# Patient Record
Sex: MALE | Race: WHITE | Employment: STUDENT | URBAN - METROPOLITAN AREA
[De-identification: names, ages, dates, MRNs, and addresses within clinical notes are randomized per-mention and may not be internally consistent; named-entity substitution may affect disease eponyms.]

---

## 2017-10-02 ENCOUNTER — OFFICE VISIT (OUTPATIENT)
Dept: FAMILY MEDICINE CLINIC | Facility: CLINIC | Age: 22
End: 2017-10-02

## 2017-10-02 ENCOUNTER — NURSE TRIAGE (OUTPATIENT)
Dept: OTHER | Age: 22
End: 2017-10-02

## 2017-10-02 VITALS
BODY MASS INDEX: 21.33 KG/M2 | HEIGHT: 70 IN | SYSTOLIC BLOOD PRESSURE: 121 MMHG | HEART RATE: 80 BPM | WEIGHT: 149 LBS | DIASTOLIC BLOOD PRESSURE: 69 MMHG | TEMPERATURE: 98 F

## 2017-10-02 DIAGNOSIS — L23.9 ALLERGIC DERMATITIS: Primary | ICD-10-CM

## 2017-10-02 PROCEDURE — 99212 OFFICE O/P EST SF 10 MIN: CPT | Performed by: FAMILY MEDICINE

## 2017-10-02 PROCEDURE — 99213 OFFICE O/P EST LOW 20 MIN: CPT | Performed by: FAMILY MEDICINE

## 2017-10-02 RX ORDER — LAMOTRIGINE 25 MG/1
TABLET, EXTENDED RELEASE ORAL
Refills: 1 | COMMUNITY
Start: 2017-09-06

## 2017-10-02 RX ORDER — CITALOPRAM 10 MG/1
TABLET ORAL
Refills: 1 | COMMUNITY
Start: 2017-09-06

## 2017-10-02 NOTE — PROGRESS NOTES
Patient ID: Nate Draper is a 25year old male.     HPI  Patient presents with:  Rash      Action Requested: Summary for Provider     []  Critical Lab, Recommendations Needed  [] Need Additional Advice  []   FYI    []   Need Orders  [] Need Medication (55.3 kg) (14 %, Z= -1.09)*    * Growth percentiles are based on CDC 2-20 Years data. Body mass index is 21.38 kg/m².     BP Readings from Last 6 Encounters:  10/02/17 : 121/69  06/11/15 : 109/71  06/28/12 : 105/67        Review of Systems   Constitution this visit:    Allergic dermatitis  -     ALLERGY - INTERNAL  He stable. He has no complaints. I will have him see the allergist for allergy testing. Follow up if symptoms persist.  Take medicine (if given) as prescribed.   Approach to treatment disc

## 2017-10-02 NOTE — TELEPHONE ENCOUNTER
Action Requested: Summary for Provider     []  Critical Lab, Recommendations Needed  [] Need Additional Advice  []   FYI    []   Need Orders  [] Need Medications Sent to Pharmacy  []  Other     SUMMARY: Appt made today with Dr. Darrel Herron to further evalu

## 2018-12-20 ENCOUNTER — OFFICE VISIT (OUTPATIENT)
Dept: FAMILY MEDICINE CLINIC | Facility: CLINIC | Age: 23
End: 2018-12-20
Payer: COMMERCIAL

## 2018-12-20 VITALS
BODY MASS INDEX: 22.05 KG/M2 | TEMPERATURE: 98 F | SYSTOLIC BLOOD PRESSURE: 117 MMHG | DIASTOLIC BLOOD PRESSURE: 72 MMHG | WEIGHT: 154 LBS | HEART RATE: 92 BPM | HEIGHT: 70 IN

## 2018-12-20 DIAGNOSIS — Z23 NEED FOR VACCINATION: ICD-10-CM

## 2018-12-20 DIAGNOSIS — D22.9 MULTIPLE NEVI: ICD-10-CM

## 2018-12-20 DIAGNOSIS — F31.61 BIPOLAR DISORDER, CURRENT EPISODE MIXED, MILD (HCC): ICD-10-CM

## 2018-12-20 DIAGNOSIS — L98.9 SKIN LESION OF BACK: Primary | ICD-10-CM

## 2018-12-20 PROCEDURE — 90715 TDAP VACCINE 7 YRS/> IM: CPT | Performed by: FAMILY MEDICINE

## 2018-12-20 PROCEDURE — 90632 HEPA VACCINE ADULT IM: CPT | Performed by: FAMILY MEDICINE

## 2018-12-20 PROCEDURE — 99214 OFFICE O/P EST MOD 30 MIN: CPT | Performed by: FAMILY MEDICINE

## 2018-12-20 PROCEDURE — 99212 OFFICE O/P EST SF 10 MIN: CPT | Performed by: FAMILY MEDICINE

## 2018-12-20 PROCEDURE — 90686 IIV4 VACC NO PRSV 0.5 ML IM: CPT | Performed by: FAMILY MEDICINE

## 2018-12-20 PROCEDURE — 90471 IMMUNIZATION ADMIN: CPT | Performed by: FAMILY MEDICINE

## 2018-12-20 PROCEDURE — 90746 HEPB VACCINE 3 DOSE ADULT IM: CPT | Performed by: FAMILY MEDICINE

## 2018-12-20 PROCEDURE — 90472 IMMUNIZATION ADMIN EACH ADD: CPT | Performed by: FAMILY MEDICINE

## 2018-12-20 NOTE — PROGRESS NOTES
Patient ID: Felipa Oliver is a 21year old male. HPI  Patient presents with:  Growth: upper back near shoulder      He has had this lesion on his right upper back for years now. Is not really bothersome to him. Wife thinks it looks bigger.   He hendricks adenopathy. Cardiovascular: Normal rate, regular rhythm and no murmur heard. Pulmonary/Chest: Effort normal and breath sounds normal. No respiratory distress. Neurological: Patient is alert and oriented to person, place, and time.    Deep tendon reflex adult exam.  Make sure to fast for 10 hours. Patience Hester, DO  12/20/2018

## 2019-09-13 ENCOUNTER — OFFICE VISIT (OUTPATIENT)
Dept: DERMATOLOGY CLINIC | Facility: CLINIC | Age: 24
End: 2019-09-13
Payer: COMMERCIAL

## 2019-09-13 DIAGNOSIS — D23.9 BENIGN NEOPLASM OF SKIN, UNSPECIFIED LOCATION: ICD-10-CM

## 2019-09-13 DIAGNOSIS — D22.9 MULTIPLE NEVI: Primary | ICD-10-CM

## 2019-09-13 PROCEDURE — 99202 OFFICE O/P NEW SF 15 MIN: CPT | Performed by: DERMATOLOGY

## 2019-09-23 NOTE — PROGRESS NOTES
Servando Oglesby is a 25year old male. Patient presents with:  Lesion: New pt. ptpresenting today with lesion to R side of back. pt states lesion has changed in size. no c/o pain or itching. Denies family and persona HX of skin cancer.             Pa Relationships      Social connections:        Talks on phone: Not on file        Gets together: Not on file        Attends Judaism service: Not on file        Active member of club or organization: Not on file        Attends meetings of clubs or Marielle History, medications, allergies as noted. Physical examination: Patient  well-developed well-nourished, alert oriented in no acute distress.   Exam of involved, appropriate areas of skin performed, including scalp, head, neck, face,nails, hair, external placed in this encounter.       Meds & Refills for this Visit:   Requested Prescriptions      No prescriptions requested or ordered in this encounter       Multiple nevi  (primary encounter diagnosis)  Benign neoplasm of skin, unspecified location    No ord

## 2021-04-08 ENCOUNTER — IMMUNIZATION (OUTPATIENT)
Dept: LAB | Age: 26
End: 2021-04-08

## 2021-04-08 DIAGNOSIS — Z23 NEED FOR VACCINATION: Primary | ICD-10-CM

## 2021-04-08 PROCEDURE — 0001A COVID 19 PFIZER-BIONTECH: CPT | Performed by: NURSE PRACTITIONER

## 2021-04-08 PROCEDURE — 91300 COVID 19 PFIZER-BIONTECH: CPT | Performed by: NURSE PRACTITIONER

## 2021-04-29 ENCOUNTER — IMMUNIZATION (OUTPATIENT)
Dept: LAB | Age: 26
End: 2021-04-29
Attending: NURSE PRACTITIONER

## 2021-04-29 DIAGNOSIS — Z23 NEED FOR VACCINATION: Primary | ICD-10-CM

## 2021-04-29 PROCEDURE — 91300 COVID 19 PFIZER-BIONTECH: CPT

## 2021-04-29 PROCEDURE — 0002A COVID 19 PFIZER-BIONTECH: CPT
